# Patient Record
Sex: FEMALE | Race: AMERICAN INDIAN OR ALASKA NATIVE
[De-identification: names, ages, dates, MRNs, and addresses within clinical notes are randomized per-mention and may not be internally consistent; named-entity substitution may affect disease eponyms.]

---

## 2017-01-20 NOTE — XRAY REPORT
LEFT FOOT RADIOGRAPHS



INDICATION: Osteo tarsal.



COMPARISON: None similar at this institution.



FINDINGS: AP, lateral and oblique left foot attempted radiographs 

demonstrate diffuse soft tissue swelling and extensive disruption of 

the hindfoot/ankle articulation with partial bony resorption, including 

the talus that appears displaced/dislocated medially with disrupted 

talonavicular articulation as well.  Osteoporosis.  Remainder forefoot 

and midfoot bones appear intact with few degenerative changes.



CONCLUSION: Left Charcot foot with extensive disruption of the 

hindfoot, including the ankle and talonavicular articulations, as 

detailed above.



Thank you for the opportunity to participate in this patient's care.

## 2017-01-20 NOTE — XRAY REPORT
LEFT ANKLE RADIOGRAPHS:



INDICATION: Osteo tarsal.



COMPARISON: None similar.



FINDINGS: AP, lateral and oblique attempted left ankle radiographs 

demonstrate extensive hindfoot radiographic abnormality with partial 

bony resorption/possible collapse or subluxation of the talus and may 

involve the subtalar joints and the distal calcaneus.  Talotibial 

articulation though appears preserved medially, to the extent 

visualized.  Degenerative changes medially at the ankle noted.  Chronic 

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  Extensive diffuse soft 

tissue swelling along the imaged lower leg, ankle and included foot 

also noted.  Few atherosclerotic vascular calcifications.  Foot bony 

demineralization may be present.  Mild dorsal midfoot spurring also 

possible.  Approximately 1.3 cm possible calcification or dorsal 

calcaneal spur.



CONCLUSION: Left Charcot foot with extensive chronic hindfoot 

disruption with degenerative changes noted, as detailed above.



Thank you for the opportunity to participate in this patient's care.

## 2018-12-05 ENCOUNTER — HOSPITAL ENCOUNTER (OUTPATIENT)
Dept: HOSPITAL 5 - LAB | Age: 78
Discharge: HOME | End: 2018-12-05
Attending: SURGERY
Payer: MEDICARE

## 2018-12-05 DIAGNOSIS — I87.311: ICD-10-CM

## 2018-12-05 DIAGNOSIS — L89.624: ICD-10-CM

## 2018-12-05 DIAGNOSIS — E11.622: ICD-10-CM

## 2018-12-05 DIAGNOSIS — E11.621: Primary | ICD-10-CM

## 2018-12-05 DIAGNOSIS — E78.5: ICD-10-CM

## 2018-12-05 DIAGNOSIS — E11.40: ICD-10-CM

## 2018-12-05 DIAGNOSIS — L97.422: ICD-10-CM

## 2018-12-05 LAB
BUN SERPL-MCNC: 95 MG/DL (ref 7–17)
BUN/CREAT SERPL: 34 %
CALCIUM SERPL-MCNC: 9.5 MG/DL (ref 8.4–10.2)
HEMOLYSIS INDEX: 4

## 2018-12-05 PROCEDURE — 83036 HEMOGLOBIN GLYCOSYLATED A1C: CPT

## 2018-12-05 PROCEDURE — 80048 BASIC METABOLIC PNL TOTAL CA: CPT

## 2018-12-05 PROCEDURE — 36415 COLL VENOUS BLD VENIPUNCTURE: CPT

## 2019-01-10 ENCOUNTER — HOSPITAL ENCOUNTER (OUTPATIENT)
Dept: HOSPITAL 5 - WOUND | Age: 79
Discharge: HOME | End: 2019-01-10
Attending: SURGERY
Payer: MEDICARE

## 2019-01-10 DIAGNOSIS — Z87.891: ICD-10-CM

## 2019-01-10 DIAGNOSIS — L97.412: ICD-10-CM

## 2019-01-10 DIAGNOSIS — I87.2: ICD-10-CM

## 2019-01-10 DIAGNOSIS — E11.622: ICD-10-CM

## 2019-01-10 DIAGNOSIS — L97.322: ICD-10-CM

## 2019-01-10 DIAGNOSIS — E11.621: Primary | ICD-10-CM

## 2019-01-10 DIAGNOSIS — I10: ICD-10-CM

## 2019-01-10 DIAGNOSIS — E11.40: ICD-10-CM

## 2019-01-10 DIAGNOSIS — L89.612: ICD-10-CM

## 2019-01-10 DIAGNOSIS — L97.422: ICD-10-CM

## 2019-01-10 DIAGNOSIS — E78.5: ICD-10-CM

## 2019-01-30 ENCOUNTER — HOSPITAL ENCOUNTER (OUTPATIENT)
Dept: HOSPITAL 5 - WOUND | Age: 79
Discharge: HOME | End: 2019-01-30
Attending: SURGERY
Payer: MEDICARE

## 2019-01-30 DIAGNOSIS — I87.2: ICD-10-CM

## 2019-01-30 DIAGNOSIS — L97.312: ICD-10-CM

## 2019-01-30 DIAGNOSIS — E11.622: ICD-10-CM

## 2019-01-30 DIAGNOSIS — L97.421: ICD-10-CM

## 2019-01-30 DIAGNOSIS — E78.5: ICD-10-CM

## 2019-01-30 DIAGNOSIS — L89.624: ICD-10-CM

## 2019-01-30 DIAGNOSIS — E11.40: ICD-10-CM

## 2019-01-30 DIAGNOSIS — L89.612: ICD-10-CM

## 2019-01-30 DIAGNOSIS — I10: ICD-10-CM

## 2019-01-30 DIAGNOSIS — L97.322: ICD-10-CM

## 2019-01-30 DIAGNOSIS — E11.621: Primary | ICD-10-CM

## 2019-01-30 DIAGNOSIS — Z87.891: ICD-10-CM

## 2019-01-30 DIAGNOSIS — L97.412: ICD-10-CM

## 2019-01-30 PROCEDURE — A6250 SKIN SEAL PROTECT MOISTURIZR: HCPCS

## 2019-01-30 PROCEDURE — 97597 DBRDMT OPN WND 1ST 20 CM/<: CPT
